# Patient Record
Sex: FEMALE | Race: WHITE | NOT HISPANIC OR LATINO | Employment: OTHER | URBAN - METROPOLITAN AREA
[De-identification: names, ages, dates, MRNs, and addresses within clinical notes are randomized per-mention and may not be internally consistent; named-entity substitution may affect disease eponyms.]

---

## 2019-04-09 NOTE — PATIENT DISCUSSION
Reassurance that the eye exam is stable today and that I do not see any concern for permanent damage fom he meds.

## 2019-11-13 ENCOUNTER — NEW REFERRAL (OUTPATIENT)
Dept: URBAN - METROPOLITAN AREA CLINIC 33 | Facility: CLINIC | Age: 64
End: 2019-11-13

## 2019-11-13 VITALS
HEIGHT: 66.5 IN | BODY MASS INDEX: 21.44 KG/M2 | DIASTOLIC BLOOD PRESSURE: 72 MMHG | SYSTOLIC BLOOD PRESSURE: 122 MMHG | HEART RATE: 68 BPM | WEIGHT: 135 LBS

## 2019-11-13 DIAGNOSIS — H33.311: ICD-10-CM

## 2019-11-13 DIAGNOSIS — H43.812: ICD-10-CM

## 2019-11-13 DIAGNOSIS — H33.011: ICD-10-CM

## 2019-11-13 PROCEDURE — 92225 OPHTHALMOSCOPY (INITIAL): CPT

## 2019-11-13 PROCEDURE — 92004 COMPRE OPH EXAM NEW PT 1/>: CPT

## 2019-11-13 ASSESSMENT — VISUAL ACUITY
OS_CC: 20/25-2
OD_CC: 20/20
OS_SC: 20/200+1
OD_SC: 20/200-2

## 2019-11-13 ASSESSMENT — TONOMETRY
OS_IOP_MMHG: 12
OD_IOP_MMHG: 10

## 2020-01-16 NOTE — PATIENT DISCUSSION
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 Explained to pt and caregiver that the defect will not likely progress, but  maybe it will get somewhat better with time. Will see if a VF can be done in the future to quantify the defect. Advised pt that he can't drive.

## 2020-01-16 NOTE — PATIENT DISCUSSION
Stroke seems to have affected right side of OD, but not OS. Does not appear to be homonomous/bilateral VF defect.

## 2020-11-30 ENCOUNTER — FOLLOW UP (OUTPATIENT)
Dept: URBAN - METROPOLITAN AREA CLINIC 33 | Facility: CLINIC | Age: 65
End: 2020-11-30

## 2020-11-30 VITALS — BODY MASS INDEX: 22.5 KG/M2 | HEIGHT: 66 IN | WEIGHT: 140 LBS

## 2020-11-30 DIAGNOSIS — H35.371: ICD-10-CM

## 2020-11-30 DIAGNOSIS — H33.311: ICD-10-CM

## 2020-11-30 DIAGNOSIS — H43.812: ICD-10-CM

## 2020-11-30 DIAGNOSIS — H33.011: ICD-10-CM

## 2020-11-30 PROCEDURE — 92201 OPSCPY EXTND RTA DRAW UNI/BI: CPT

## 2020-11-30 PROCEDURE — 92134 CPTRZ OPH DX IMG PST SGM RTA: CPT

## 2020-11-30 PROCEDURE — 92014 COMPRE OPH EXAM EST PT 1/>: CPT

## 2020-11-30 ASSESSMENT — VISUAL ACUITY
OD_CC: 20/20-2
OS_CC: 20/25-2

## 2020-11-30 ASSESSMENT — TONOMETRY
OD_IOP_MMHG: 11
OS_IOP_MMHG: 12

## 2021-02-09 NOTE — PATIENT DISCUSSION
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 Explained to pt and caregiver that the defect will not likely progress.  Advised pt that he can't drive.

## 2021-12-01 ENCOUNTER — FOLLOW UP (OUTPATIENT)
Dept: URBAN - METROPOLITAN AREA CLINIC 33 | Facility: CLINIC | Age: 66
End: 2021-12-01

## 2021-12-01 DIAGNOSIS — H33.011: ICD-10-CM

## 2021-12-01 DIAGNOSIS — H35.371: ICD-10-CM

## 2021-12-01 DIAGNOSIS — H33.311: ICD-10-CM

## 2021-12-01 DIAGNOSIS — H43.812: ICD-10-CM

## 2021-12-01 PROCEDURE — 92014 COMPRE OPH EXAM EST PT 1/>: CPT

## 2021-12-01 PROCEDURE — 92134 CPTRZ OPH DX IMG PST SGM RTA: CPT

## 2021-12-01 ASSESSMENT — VISUAL ACUITY
OD_CC: 20/20
OS_CC: 20/25

## 2021-12-01 ASSESSMENT — TONOMETRY
OD_IOP_MMHG: 14
OS_IOP_MMHG: 13

## 2022-02-11 ENCOUNTER — IMPORTED ENCOUNTER (OUTPATIENT)
Dept: URBAN - METROPOLITAN AREA CLINIC 31 | Facility: CLINIC | Age: 67
End: 2022-02-11

## 2022-12-05 ENCOUNTER — COMPREHENSIVE EXAM (OUTPATIENT)
Dept: URBAN - METROPOLITAN AREA CLINIC 33 | Facility: CLINIC | Age: 67
End: 2022-12-05

## 2022-12-05 VITALS — WEIGHT: 135 LBS | HEIGHT: 66 IN | BODY MASS INDEX: 21.69 KG/M2

## 2022-12-05 PROCEDURE — 92014 COMPRE OPH EXAM EST PT 1/>: CPT

## 2022-12-05 PROCEDURE — 92134 CPTRZ OPH DX IMG PST SGM RTA: CPT

## 2022-12-05 PROCEDURE — 92250 FUNDUS PHOTOGRAPHY W/I&R: CPT

## 2022-12-05 ASSESSMENT — VISUAL ACUITY
OS_PH: 20/25+2
OS_CC: 20/40
OD_CC: 20/20-1

## 2022-12-05 ASSESSMENT — TONOMETRY
OD_IOP_MMHG: 13
OS_IOP_MMHG: 6

## 2023-12-08 ENCOUNTER — COMPREHENSIVE EXAM (OUTPATIENT)
Dept: URBAN - METROPOLITAN AREA CLINIC 33 | Facility: CLINIC | Age: 68
End: 2023-12-08

## 2023-12-08 VITALS — BODY MASS INDEX: 21.5 KG/M2 | HEIGHT: 67 IN | WEIGHT: 137 LBS

## 2023-12-08 DIAGNOSIS — H35.371: ICD-10-CM

## 2023-12-08 DIAGNOSIS — H33.311: ICD-10-CM

## 2023-12-08 DIAGNOSIS — H33.011: ICD-10-CM

## 2023-12-08 DIAGNOSIS — H26.493: ICD-10-CM

## 2023-12-08 DIAGNOSIS — H43.812: ICD-10-CM

## 2023-12-08 PROCEDURE — 92134 CPTRZ OPH DX IMG PST SGM RTA: CPT

## 2023-12-08 PROCEDURE — 92014 COMPRE OPH EXAM EST PT 1/>: CPT

## 2023-12-08 ASSESSMENT — TONOMETRY
OD_IOP_MMHG: 10
OS_IOP_MMHG: 12

## 2023-12-08 ASSESSMENT — VISUAL ACUITY
OD_CC: 20/20
OS_CC: 20/30
